# Patient Record
Sex: FEMALE | Race: WHITE | NOT HISPANIC OR LATINO | Employment: UNEMPLOYED | URBAN - NONMETROPOLITAN AREA
[De-identification: names, ages, dates, MRNs, and addresses within clinical notes are randomized per-mention and may not be internally consistent; named-entity substitution may affect disease eponyms.]

---

## 2017-04-13 ENCOUNTER — TRANSCRIBE ORDERS (OUTPATIENT)
Dept: ULTRASOUND IMAGING | Facility: HOSPITAL | Age: 52
End: 2017-04-13

## 2017-04-13 DIAGNOSIS — R09.89 BRUIT: Primary | ICD-10-CM

## 2017-04-21 ENCOUNTER — HOSPITAL ENCOUNTER (OUTPATIENT)
Dept: ULTRASOUND IMAGING | Facility: HOSPITAL | Age: 52
Discharge: HOME | End: 2017-04-21

## 2017-04-21 ENCOUNTER — HOSPITAL ENCOUNTER (OUTPATIENT)
Dept: ULTRASOUND IMAGING | Facility: HOSPITAL | Age: 52
Discharge: HOME | End: 2017-04-21
Admitting: NURSE PRACTITIONER

## 2017-04-21 DIAGNOSIS — R09.89 BRUIT: ICD-10-CM

## 2017-04-21 PROCEDURE — 76700 US EXAM ABDOM COMPLETE: CPT

## 2017-04-21 PROCEDURE — 76856 US EXAM PELVIC COMPLETE: CPT

## 2017-04-21 PROCEDURE — 76830 TRANSVAGINAL US NON-OB: CPT

## 2017-06-27 ENCOUNTER — OFFICE VISIT (OUTPATIENT)
Dept: GASTROENTEROLOGY | Facility: CLINIC | Age: 52
End: 2017-06-27

## 2017-06-27 VITALS
HEIGHT: 72 IN | WEIGHT: 169 LBS | BODY MASS INDEX: 22.89 KG/M2 | HEART RATE: 109 BPM | SYSTOLIC BLOOD PRESSURE: 137 MMHG | DIASTOLIC BLOOD PRESSURE: 78 MMHG

## 2017-06-27 DIAGNOSIS — B18.2 CHRONIC HEPATITIS C WITHOUT HEPATIC COMA (HCC): Primary | ICD-10-CM

## 2017-06-27 PROCEDURE — 99213 OFFICE O/P EST LOW 20 MIN: CPT | Performed by: INTERNAL MEDICINE

## 2017-06-27 RX ORDER — AMITRIPTYLINE HYDROCHLORIDE 10 MG/1
10 TABLET, FILM COATED ORAL DAILY
COMMUNITY
Start: 2017-06-21 | End: 2022-05-16

## 2017-06-27 RX ORDER — BUSPIRONE HYDROCHLORIDE 7.5 MG/1
7.5 TABLET ORAL DAILY
COMMUNITY
Start: 2017-06-21

## 2017-06-27 RX ORDER — MONTELUKAST SODIUM 10 MG/1
10 TABLET ORAL DAILY
COMMUNITY
Start: 2017-06-21 | End: 2022-05-16

## 2017-06-27 RX ORDER — ALBUTEROL SULFATE 90 UG/1
2 AEROSOL, METERED RESPIRATORY (INHALATION)
COMMUNITY
End: 2022-06-20 | Stop reason: SDUPTHER

## 2017-06-27 RX ORDER — LEVOFLOXACIN 500 MG/1
500 TABLET, FILM COATED ORAL DAILY
COMMUNITY
End: 2022-05-16

## 2017-06-27 RX ORDER — IBUPROFEN 200 MG
200 TABLET ORAL EVERY 6 HOURS PRN
COMMUNITY

## 2017-06-27 RX ORDER — CITALOPRAM 20 MG/1
20 TABLET ORAL DAILY
COMMUNITY
End: 2022-05-16

## 2017-06-27 NOTE — PROGRESS NOTES
[facility]      Chief Complaint:   Chief Complaint   Patient presents with   • Hepatitis C     Ref LIZ Santoro       Subjective     HPI:   Patient with recent diagnosis of hepatitis C.  Patient states she contracted this secondary to her son living in her house.  Patient has no history of IV drug use or blood transfusions.  Patient does not drink alcohol.    Plan; we'll do lab work including hepatitis C profile and hepatitis B profile.  We'll consider treatment for hepatitis C depending on results.  Patient had an ultrasound done which was normal.    Past Medical History:   Past Medical History:   Diagnosis Date   • Abnormal liver function tests     (AST 53)   • Acute bronchitis    • Acute gastritis    • Alcoholic fatty liver    • Dysfunctional uterine bleeding    • Elevated levels of transaminase & lactic acid dehydrogenase    • Fatigue    • Gastrointestinal food allergy     Wheat .63, sesame seed .23, peanut .33, shrimp .29, walnut .12, hazelnut .62.    • Iron overload     carrier hemochromatosis one copy 282Y    • Lesion of liver    • Nicotine dependence    • Palpitations    • Steatosis of liver    • Torsion of ovary    • Vertigo    • Wry neck     torticollis         Family History:  History reviewed. No pertinent family history.    Social History:   reports that she has been smoking.  She has never used smokeless tobacco. She reports that she does not drink alcohol or use illicit drugs.    Medications:   Current Outpatient Prescriptions   Medication Sig Dispense Refill   • albuterol (PROVENTIL HFA;VENTOLIN HFA) 108 (90 BASE) MCG/ACT inhaler Inhale 2 puffs 4 (Four) Times a Day.     • citalopram (CeleXA) 20 MG tablet Take 20 mg by mouth Daily.     • ibuprofen (ADVIL,MOTRIN) 200 MG tablet Take 200 mg by mouth Every 6 (Six) Hours As Needed for Mild Pain (1-3).     • levoFLOXacin (LEVAQUIN) 500 MG tablet Take 500 mg by mouth Daily.     • Ped Multivitamins-Fl-Iron (MULTIVITAMIN WITH  "FLUORIDE/IRON) 0.25-10 MG/ML solution solution Take  by mouth Daily.     • amitriptyline (ELAVIL) 10 MG tablet Take 10 mg by mouth Daily.     • busPIRone (BUSPAR) 7.5 MG tablet Take 7.5 mg by mouth Daily.     • montelukast (SINGULAIR) 10 MG tablet Take 10 mg by mouth Daily.       No current facility-administered medications for this visit.        Allergies:  Azithromycin; Morphine and related; Penicillins; and Sulfa antibiotics    ROS:    Review of Systems   Constitutional: Negative for activity change, appetite change, chills, diaphoresis, fatigue, fever and unexpected weight change.   HENT: Negative for sore throat and trouble swallowing.    Respiratory: Negative for shortness of breath.    Gastrointestinal: Negative for abdominal distention, abdominal pain, anal bleeding, blood in stool, constipation, diarrhea, nausea, rectal pain and vomiting.   Musculoskeletal: Negative for arthralgias.   Skin: Negative for pallor.   Neurological: Negative for light-headedness.     Objective     Blood pressure 137/78, pulse 109, height 72\" (182.9 cm), weight 169 lb (76.7 kg).    Physical Exam   Constitutional: She is oriented to person, place, and time. She appears well-developed and well-nourished. No distress.   HENT:   Head: Normocephalic and atraumatic.   Cardiovascular: Normal rate, regular rhythm, normal heart sounds and intact distal pulses.  Exam reveals no gallop and no friction rub.    No murmur heard.  Pulmonary/Chest: Breath sounds normal. No respiratory distress. She has no wheezes. She has no rales. She exhibits no tenderness.   Abdominal: Soft. Bowel sounds are normal. She exhibits no distension and no mass. There is no tenderness. There is no rebound and no guarding. No hernia.   Musculoskeletal: Normal range of motion. She exhibits no edema.   Neurological: She is alert and oriented to person, place, and time.   Skin: Skin is warm and dry. No rash noted. She is not diaphoretic. No erythema. No pallor. "   Psychiatric: She has a normal mood and affect. Her behavior is normal. Judgment and thought content normal.        Assessment/Plan   Rosalie was seen today for hepatitis c.    Diagnoses and all orders for this visit:    Chronic hepatitis C without hepatic coma  -     AFP Tumor Marker  -     CBC & Differential  -     Comprehensive Metabolic Panel  -     Ethanol  -     HCV FibroSURE  -     HCV NS5A Drug Resistance Assay; Future  -     Hepatitis C Genotype; Future  -     Hepatitis C RNA, Quantitative, PCR (graph)  -     Urine Drug Screen  -     Cancel: US Abdomen Complete  -     Hepatitis B & C Profile        * Surgery not found *     Diagnosis Plan   1. Chronic hepatitis C without hepatic coma  AFP Tumor Marker    CBC & Differential    Comprehensive Metabolic Panel    Ethanol    HCV FibroSURE    HCV NS5A Drug Resistance Assay    Hepatitis C Genotype    Hepatitis C RNA, Quantitative, PCR (graph)    Urine Drug Screen    Hepatitis B & C Profile       Anticipated Surgical Procedure:  Orders Placed This Encounter   Procedures   • AFP Tumor Marker   • Comprehensive Metabolic Panel   • Ethanol   • HCV FibroSURE   • HCV NS5A Drug Resistance Assay     Standing Status:   Future     Standing Expiration Date:   6/27/2018   • Hepatitis C Genotype     Standing Status:   Future     Standing Expiration Date:   6/27/2018   • Hepatitis C RNA, Quantitative, PCR (graph)   • Urine Drug Screen   • Hepatitis B & C Profile   • CBC & Differential     Order Specific Question:   Manual Differential     Answer:   No       The risks, benefits, and alternatives of this procedure have been discussed with the patient or the responsible party- the patient understands and agrees to proceed.

## 2017-07-25 ENCOUNTER — LAB (OUTPATIENT)
Dept: LAB | Facility: HOSPITAL | Age: 52
End: 2017-07-25

## 2017-07-25 DIAGNOSIS — B18.2 CHRONIC HEPATITIS C WITHOUT HEPATIC COMA (HCC): ICD-10-CM

## 2017-07-25 LAB
ALBUMIN SERPL-MCNC: 4.7 G/DL (ref 3.4–4.8)
ALBUMIN/GLOB SERPL: 1.4 G/DL (ref 1.1–1.8)
ALP SERPL-CCNC: 100 U/L (ref 38–126)
ALT SERPL W P-5'-P-CCNC: 29 U/L (ref 9–52)
ANION GAP SERPL CALCULATED.3IONS-SCNC: 9 MMOL/L (ref 5–15)
AST SERPL-CCNC: 43 U/L (ref 14–36)
BASOPHILS # BLD AUTO: 0.03 10*3/MM3 (ref 0–0.2)
BASOPHILS NFR BLD AUTO: 0.4 % (ref 0–2)
BILIRUB SERPL-MCNC: 0.5 MG/DL (ref 0.2–1.3)
BUN BLD-MCNC: 12 MG/DL (ref 7–21)
BUN/CREAT SERPL: 20 (ref 7–25)
CALCIUM SPEC-SCNC: 9.5 MG/DL (ref 8.4–10.2)
CHLORIDE SERPL-SCNC: 99 MMOL/L (ref 95–110)
CO2 SERPL-SCNC: 27 MMOL/L (ref 22–31)
CREAT BLD-MCNC: 0.6 MG/DL (ref 0.5–1)
DEPRECATED RDW RBC AUTO: 52.9 FL (ref 36.4–46.3)
EOSINOPHIL # BLD AUTO: 0.04 10*3/MM3 (ref 0–0.7)
EOSINOPHIL NFR BLD AUTO: 0.5 % (ref 0–7)
ERYTHROCYTE [DISTWIDTH] IN BLOOD BY AUTOMATED COUNT: 13 % (ref 11.5–14.5)
ETHANOL BLD-MCNC: <10 MG/DL (ref 0–10)
ETHANOL UR QL: <0.01 %
GFR SERPL CREATININE-BSD FRML MDRD: 105 ML/MIN/1.73 (ref 51–120)
GLOBULIN UR ELPH-MCNC: 3.3 GM/DL (ref 2.3–3.5)
GLUCOSE BLD-MCNC: 107 MG/DL (ref 60–100)
HCT VFR BLD AUTO: 44.5 % (ref 35–45)
HGB BLD-MCNC: 15 G/DL (ref 12–15.5)
IMM GRANULOCYTES # BLD: 0.02 10*3/MM3 (ref 0–0.02)
IMM GRANULOCYTES NFR BLD: 0.3 % (ref 0–0.5)
LYMPHOCYTES # BLD AUTO: 2.53 10*3/MM3 (ref 0.6–4.2)
LYMPHOCYTES NFR BLD AUTO: 33.2 % (ref 10–50)
MACROCYTES BLD QL SMEAR: NORMAL
MCH RBC QN AUTO: 37.6 PG (ref 26.5–34)
MCHC RBC AUTO-ENTMCNC: 33.7 G/DL (ref 31.4–36)
MCV RBC AUTO: 111.5 FL (ref 80–98)
MONOCYTES # BLD AUTO: 0.74 10*3/MM3 (ref 0–0.9)
MONOCYTES NFR BLD AUTO: 9.7 % (ref 0–12)
NEUTROPHILS # BLD AUTO: 4.25 10*3/MM3 (ref 2–8.6)
NEUTROPHILS NFR BLD AUTO: 55.9 % (ref 37–80)
NRBC BLD MANUAL-RTO: 0 /100 WBC (ref 0–0)
PLATELET # BLD AUTO: 320 10*3/MM3 (ref 150–450)
PMV BLD AUTO: 10.6 FL (ref 8–12)
POTASSIUM BLD-SCNC: 4 MMOL/L (ref 3.5–5.1)
PROT SERPL-MCNC: 8 G/DL (ref 6.3–8.6)
RBC # BLD AUTO: 3.99 10*6/MM3 (ref 3.77–5.16)
SMALL PLATELETS BLD QL SMEAR: ADEQUATE
SODIUM BLD-SCNC: 135 MMOL/L (ref 137–145)
WBC MORPH BLD: NORMAL
WBC NRBC COR # BLD: 7.61 10*3/MM3 (ref 3.2–9.8)

## 2017-07-25 PROCEDURE — 80074 ACUTE HEPATITIS PANEL: CPT | Performed by: INTERNAL MEDICINE

## 2017-07-25 PROCEDURE — 83010 ASSAY OF HAPTOGLOBIN QUANT: CPT | Performed by: INTERNAL MEDICINE

## 2017-07-25 PROCEDURE — 85025 COMPLETE CBC W/AUTO DIFF WBC: CPT | Performed by: INTERNAL MEDICINE

## 2017-07-25 PROCEDURE — 80053 COMPREHEN METABOLIC PANEL: CPT | Performed by: INTERNAL MEDICINE

## 2017-07-25 PROCEDURE — 36415 COLL VENOUS BLD VENIPUNCTURE: CPT | Performed by: INTERNAL MEDICINE

## 2017-07-25 PROCEDURE — 85007 BL SMEAR W/DIFF WBC COUNT: CPT | Performed by: INTERNAL MEDICINE

## 2017-07-25 PROCEDURE — 87900 PHENOTYPE INFECT AGENT DRUG: CPT | Performed by: INTERNAL MEDICINE

## 2017-07-25 PROCEDURE — 82247 BILIRUBIN TOTAL: CPT | Performed by: INTERNAL MEDICINE

## 2017-07-25 PROCEDURE — 82977 ASSAY OF GGT: CPT | Performed by: INTERNAL MEDICINE

## 2017-07-25 PROCEDURE — 80307 DRUG TEST PRSMV CHEM ANLYZR: CPT | Performed by: INTERNAL MEDICINE

## 2017-07-25 PROCEDURE — 83883 ASSAY NEPHELOMETRY NOT SPEC: CPT | Performed by: INTERNAL MEDICINE

## 2017-07-25 PROCEDURE — 82105 ALPHA-FETOPROTEIN SERUM: CPT | Performed by: INTERNAL MEDICINE

## 2017-07-25 PROCEDURE — 84460 ALANINE AMINO (ALT) (SGPT): CPT | Performed by: INTERNAL MEDICINE

## 2017-07-25 PROCEDURE — 87902 NFCT AGT GNTYP ALYS HEP C: CPT | Performed by: INTERNAL MEDICINE

## 2017-07-25 PROCEDURE — 87522 HEPATITIS C REVRS TRNSCRPJ: CPT | Performed by: INTERNAL MEDICINE

## 2017-07-26 LAB
AFP-TM SERPL-MCNC: 3.8 NG/ML (ref 0–8.3)
HAV IGM SERPL QL IA: NEGATIVE
HBV CORE IGM SERPL QL IA: NEGATIVE
HBV SURFACE AG SERPL QL IA: NEGATIVE
HCV AB SER DONR QL: REACTIVE

## 2017-07-27 LAB
A2 MACROGLOB SERPL-MCNC: 242 MG/DL (ref 110–276)
ALT SERPL W P-5'-P-CCNC: 19 IU/L (ref 0–40)
APO A-I SERPL-MCNC: 328 MG/DL (ref 116–209)
BILIRUB SERPL-MCNC: 0.3 MG/DL (ref 0–1.2)
FIBROSIS SCORING:: ABNORMAL
FIBROSIS STAGE SERPL QL: ABNORMAL
GGT SERPL-CCNC: 31 IU/L (ref 0–60)
HAPTOGLOB SERPL-MCNC: 212 MG/DL (ref 34–200)
HCV AB SER QL: ABNORMAL
HCV RNA SERPL NAA+PROBE-ACNC: NORMAL IU/ML
LABORATORY COMMENT REPORT: ABNORMAL
LIMITATIONS:: ABNORMAL
LIVER FIBR SCORE SERPL CALC.FIBROSURE: 0.02 (ref 0–0.21)
NECROINFLAMM ACTIVITY SCORING:: ABNORMAL
NECROINFLAMMATORY ACT GRADE SERPL QL: ABNORMAL
NECROINFLAMMATORY ACT SCORE SERPL: 0.05 (ref 0–0.17)
TEST INFORMATION: NORMAL

## 2017-07-28 LAB
HCV GENTYP SERPL NAA+PROBE: NORMAL
Lab: NORMAL

## 2017-08-02 LAB
HCV NS5 MUT DET ISLT GENOTYP: NORMAL
REF LAB TEST METHOD: NORMAL

## 2017-08-24 ENCOUNTER — OFFICE VISIT (OUTPATIENT)
Dept: GASTROENTEROLOGY | Facility: CLINIC | Age: 52
End: 2017-08-24

## 2017-08-24 VITALS
SYSTOLIC BLOOD PRESSURE: 143 MMHG | BODY MASS INDEX: 23.91 KG/M2 | WEIGHT: 167 LBS | HEIGHT: 70 IN | DIASTOLIC BLOOD PRESSURE: 78 MMHG | HEART RATE: 77 BPM

## 2017-08-24 DIAGNOSIS — B18.2 CHRONIC HEPATITIS C WITHOUT HEPATIC COMA (HCC): Primary | ICD-10-CM

## 2017-08-24 PROCEDURE — 99212 OFFICE O/P EST SF 10 MIN: CPT | Performed by: INTERNAL MEDICINE

## 2017-08-24 NOTE — PROGRESS NOTES
[facility]      Chief Complaint:   Chief Complaint   Patient presents with   • Results   • Hepatitis C       Subjective     HPI:   Patient with positive antibody to hepatitis C.  On testing for hepatitis C patients came back nondetectable.  Patient most likely is self cure will need repeat hepatitis C by PCR in 6 months.    Plan; while patient follow up with primary doctor.  If patient's hepatitis C comes back positive in 6 months she will need to come and see me.    Past Medical History:   Past Medical History:   Diagnosis Date   • Abnormal liver function tests     (AST 53)   • Acute bronchitis    • Acute gastritis    • Alcoholic fatty liver    • Dysfunctional uterine bleeding    • Elevated levels of transaminase & lactic acid dehydrogenase    • Fatigue    • Gastrointestinal food allergy     Wheat .63, sesame seed .23, peanut .33, shrimp .29, walnut .12, hazelnut .62.    • Iron overload     carrier hemochromatosis one copy 282Y    • Lesion of liver    • Nicotine dependence    • Palpitations    • Steatosis of liver    • Torsion of ovary    • Vertigo    • Wry neck     torticollis         Family History:  History reviewed. No pertinent family history.    Social History:   reports that she has been smoking.  She has never used smokeless tobacco. She reports that she does not drink alcohol or use illicit drugs.    Medications:   Current Outpatient Prescriptions   Medication Sig Dispense Refill   • albuterol (PROVENTIL HFA;VENTOLIN HFA) 108 (90 BASE) MCG/ACT inhaler Inhale 2 puffs 4 (Four) Times a Day.     • amitriptyline (ELAVIL) 10 MG tablet Take 10 mg by mouth Daily.     • busPIRone (BUSPAR) 7.5 MG tablet Take 7.5 mg by mouth Daily.     • citalopram (CeleXA) 20 MG tablet Take 20 mg by mouth Daily.     • ibuprofen (ADVIL,MOTRIN) 200 MG tablet Take 200 mg by mouth Every 6 (Six) Hours As Needed for Mild Pain (1-3).     • levoFLOXacin (LEVAQUIN) 500 MG tablet Take 500 mg by mouth Daily.     •  "montelukast (SINGULAIR) 10 MG tablet Take 10 mg by mouth Daily.     • Ped Multivitamins-Fl-Iron (MULTIVITAMIN WITH FLUORIDE/IRON) 0.25-10 MG/ML solution solution Take  by mouth Daily.       No current facility-administered medications for this visit.        Allergies:  Azithromycin; Morphine and related; Penicillins; and Sulfa antibiotics    ROS:    Review of Systems   Constitutional: Negative for activity change, appetite change, chills, diaphoresis, fatigue, fever and unexpected weight change.   HENT: Negative for sore throat and trouble swallowing.    Respiratory: Negative for shortness of breath.    Gastrointestinal: Negative for abdominal distention, abdominal pain, anal bleeding, blood in stool, constipation, diarrhea, nausea, rectal pain and vomiting.   Musculoskeletal: Negative for arthralgias.   Skin: Negative for pallor.   Neurological: Negative for light-headedness.     Objective     Blood pressure 143/78, pulse 77, height 70\" (177.8 cm), weight 167 lb (75.8 kg).    Physical Exam   Constitutional: She is oriented to person, place, and time. She appears well-developed and well-nourished. No distress.   HENT:   Head: Normocephalic and atraumatic.   Cardiovascular: Normal rate, regular rhythm, normal heart sounds and intact distal pulses.  Exam reveals no gallop and no friction rub.    No murmur heard.  Pulmonary/Chest: Breath sounds normal. No respiratory distress. She has no wheezes. She has no rales. She exhibits no tenderness.   Abdominal: Soft. Bowel sounds are normal. She exhibits no distension and no mass. There is no tenderness. There is no rebound and no guarding. No hernia.   Musculoskeletal: Normal range of motion. She exhibits no edema.   Neurological: She is alert and oriented to person, place, and time.   Skin: Skin is warm and dry. No rash noted. She is not diaphoretic. No erythema. No pallor.   Psychiatric: She has a normal mood and affect. Her behavior is normal. Judgment and thought content " normal.        Assessment/Plan   Rosalie was seen today for results and hepatitis c.    Diagnoses and all orders for this visit:    Chronic hepatitis C without hepatic coma        * Surgery not found *     Diagnosis Plan   1. Chronic hepatitis C without hepatic coma         Anticipated Surgical Procedure:  No orders of the defined types were placed in this encounter.      The risks, benefits, and alternatives of this procedure have been discussed with the patient or the responsible party- the patient understands and agrees to proceed.

## 2021-10-14 ENCOUNTER — HOSPITAL ENCOUNTER (OUTPATIENT)
Dept: CT IMAGING | Facility: HOSPITAL | Age: 56
Discharge: HOME | End: 2021-10-14
Admitting: FAMILY MEDICINE

## 2021-10-14 DIAGNOSIS — Z12.2 SCREENING FOR MALIGNANT NEOPLASM OF RESPIRATORY ORGAN: ICD-10-CM

## 2021-10-14 DIAGNOSIS — Z87.891 PERSONAL HISTORY OF TOBACCO USE, PRESENTING HAZARDS TO HEALTH: ICD-10-CM

## 2021-10-14 PROCEDURE — 71271 CT THORAX LUNG CANCER SCR C-: CPT

## 2022-03-29 DIAGNOSIS — J44.9 CHRONIC OBSTRUCTIVE PULMONARY DISEASE, UNSPECIFIED COPD TYPE: Primary | ICD-10-CM

## 2022-05-16 ENCOUNTER — OFFICE VISIT (OUTPATIENT)
Dept: PULMONOLOGY | Facility: CLINIC | Age: 57
End: 2022-05-16

## 2022-05-16 ENCOUNTER — PROCEDURE VISIT (OUTPATIENT)
Dept: PULMONOLOGY | Facility: CLINIC | Age: 57
End: 2022-05-16

## 2022-05-16 VITALS
BODY MASS INDEX: 26.34 KG/M2 | WEIGHT: 184 LBS | HEIGHT: 70 IN | DIASTOLIC BLOOD PRESSURE: 72 MMHG | HEART RATE: 65 BPM | OXYGEN SATURATION: 95 % | SYSTOLIC BLOOD PRESSURE: 106 MMHG

## 2022-05-16 DIAGNOSIS — F17.218 CIGARETTE NICOTINE DEPENDENCE WITH OTHER NICOTINE-INDUCED DISORDER: ICD-10-CM

## 2022-05-16 DIAGNOSIS — J44.9 COPD, VERY SEVERE: Primary | ICD-10-CM

## 2022-05-16 DIAGNOSIS — J44.9 CHRONIC OBSTRUCTIVE PULMONARY DISEASE, UNSPECIFIED COPD TYPE: ICD-10-CM

## 2022-05-16 PROCEDURE — 94727 GAS DIL/WSHOT DETER LNG VOL: CPT | Performed by: INTERNAL MEDICINE

## 2022-05-16 PROCEDURE — 99204 OFFICE O/P NEW MOD 45 MIN: CPT | Performed by: INTERNAL MEDICINE

## 2022-05-16 PROCEDURE — 94060 EVALUATION OF WHEEZING: CPT | Performed by: INTERNAL MEDICINE

## 2022-05-16 PROCEDURE — 94729 DIFFUSING CAPACITY: CPT | Performed by: INTERNAL MEDICINE

## 2022-05-16 RX ORDER — IPRATROPIUM BROMIDE AND ALBUTEROL SULFATE 2.5; .5 MG/3ML; MG/3ML
3 SOLUTION RESPIRATORY (INHALATION) EVERY 4 HOURS PRN
Qty: 360 ML | Refills: 11 | Status: SHIPPED | OUTPATIENT
Start: 2022-05-16

## 2022-05-16 RX ORDER — PREDNISONE 10 MG/1
TABLET ORAL
Qty: 27 TABLET | Refills: 0 | Status: SHIPPED | OUTPATIENT
Start: 2022-05-16 | End: 2022-06-20

## 2022-05-16 RX ORDER — BUDESONIDE AND FORMOTEROL FUMARATE DIHYDRATE 160; 4.5 UG/1; UG/1
2 AEROSOL RESPIRATORY (INHALATION)
COMMUNITY
End: 2022-06-20 | Stop reason: CLARIF

## 2022-05-16 RX ORDER — ALBUTEROL SULFATE 1.25 MG/3ML
1 SOLUTION RESPIRATORY (INHALATION) EVERY 6 HOURS PRN
COMMUNITY
End: 2022-05-16

## 2022-05-16 RX ORDER — FLUOXETINE HYDROCHLORIDE 20 MG/1
20 CAPSULE ORAL DAILY
COMMUNITY

## 2022-05-16 RX ORDER — ALBUTEROL SULFATE 90 UG/1
2 AEROSOL, METERED RESPIRATORY (INHALATION) EVERY 4 HOURS PRN
Qty: 18 G | Refills: 5 | Status: SHIPPED | OUTPATIENT
Start: 2022-05-16 | End: 2023-03-01

## 2022-05-16 RX ORDER — PROPRANOLOL HYDROCHLORIDE 10 MG/1
10 TABLET ORAL 2 TIMES DAILY
COMMUNITY

## 2022-05-16 NOTE — PROGRESS NOTES
FULL PFT WITH BRONCHODILATOR PERFORMED.     7+ SECOND EXHALATION ON SPIROMETRY, NO PLATEAU ACHIEVED, END OF TEST CRITERIA NOT MET.     GOOD PATIENT EFFORT AND COOPERATION.    ORDERED BY DR. ASHER, READ BY DR. ASHER    ((PT VERY SHORT OF BREATH DURING AND IN BETWEEN MANEUVERS. O2 SATS RANGING FROM 93-94, HR RANGING FROM 68-71))    NON-PRODUCTIVE, THICK COUGH

## 2022-05-16 NOTE — PROGRESS NOTES
"    Pulmonary Consultation    Ally Mendes MD,    Thank you for asking me to see Rosalie Fernandez for   Chief Complaint   Patient presents with   • COPD   .      History of Present Illness  Rosalie Fernandez is a 57 y.o. female     Patient referred for \"asthma exacerbation with COPD\" from [facility]    Patient states she has been told for years that she has asthma, or bronchitis or COPD. Has previously been on Advair and Breo, now on Symbicort for the last month. Doesn't feel like it's working. She is having more cough and shortness of breath. She quit smoking a week ago, was smoking 2-3 ppd. She has never seen a pulmnologist and never had full PFTs. She had a lung cancer screening Ct in Oct 2021 that didn't show anything concerning for cancer.      Tobacco use history:  Type: cigarettes  Amount: 2-3 ppd  Duration: 40 years  Cessation: 1 week ago         Review of Systems: History obtained from chart review and the patient.  Review of Systems  As described in the HPI. Otherwise, remainder of ROS (14 systems) were negative.    Patient Active Problem List   Diagnosis   • Chronic hepatitis C without hepatic coma (HCC)   • COPD, very severe (HCC)         Current Outpatient Medications:   •  albuterol (ACCUNEB) 1.25 MG/3ML nebulizer solution, Take 1 ampule by nebulization Every 6 (Six) Hours As Needed for Wheezing., Disp: , Rfl:   •  albuterol (PROVENTIL HFA;VENTOLIN HFA) 108 (90 BASE) MCG/ACT inhaler, Inhale 2 puffs 4 (Four) Times a Day., Disp: , Rfl:   •  budesonide-formoterol (SYMBICORT) 160-4.5 MCG/ACT inhaler, Inhale 2 puffs 2 (Two) Times a Day., Disp: , Rfl:   •  busPIRone (BUSPAR) 7.5 MG tablet, Take 7.5 mg by mouth Daily., Disp: , Rfl:   •  FLUoxetine (PROzac) 20 MG capsule, Take 20 mg by mouth Daily., Disp: , Rfl:   •  ibuprofen (ADVIL,MOTRIN) 200 MG tablet, Take 200 mg by mouth Every 6 (Six) Hours As Needed for Mild Pain (1-3)., Disp: , Rfl:   •  Ped Multivitamins-Fl-Iron (MULTIVITAMIN WITH FLUORIDE/IRON) " 0.25-10 MG/ML solution solution, Take  by mouth Daily., Disp: , Rfl:   •  propranolol (INDERAL) 10 MG tablet, Take 10 mg by mouth 2 (Two) Times a Day., Disp: , Rfl:   •  amitriptyline (ELAVIL) 10 MG tablet, Take 10 mg by mouth Daily., Disp: , Rfl:   •  citalopram (CeleXA) 20 MG tablet, Take 20 mg by mouth Daily., Disp: , Rfl:   •  levoFLOXacin (LEVAQUIN) 500 MG tablet, Take 500 mg by mouth Daily., Disp: , Rfl:   •  montelukast (SINGULAIR) 10 MG tablet, Take 10 mg by mouth Daily., Disp: , Rfl:     Allergies   Allergen Reactions   • Azithromycin    • Morphine And Related    • Penicillins    • Sulfa Antibiotics        Past Medical History:   Diagnosis Date   • Abnormal liver function tests     (AST 53)   • Acute bronchitis    • Acute gastritis    • Alcoholic fatty liver    • Dysfunctional uterine bleeding    • Elevated levels of transaminase & lactic acid dehydrogenase    • Fatigue    • Gastrointestinal food allergy     Wheat .63, sesame seed .23, peanut .33, shrimp .29, walnut .12, hazelnut .62.    • Iron overload     carrier hemochromatosis one copy 282Y    • Lesion of liver    • Nicotine dependence    • Palpitations    • Steatosis of liver    • Torsion of ovary    • Vertigo    • Wry neck     torticollis       Past Surgical History:   Procedure Laterality Date   • COLONOSCOPY  02/23/2015    Internal and external hemorrhoids found.   • ESOPHAGOSCOPY / EGD  02/23/2015    Normal esophagus. Gastritis found in the stomach. Biopsy taken. Normal duodenum. Biopsy taken   • EXPLORATORY LAPAROTOMY  11/11/2013    Exploratory laparotomy. Right oophorectomy, Dilatation and curettage with frozen section   • OOPHORECTOMY     • UPPER GASTROINTESTINAL ENDOSCOPY  02/23/2015     Social History     Socioeconomic History   • Marital status:    Tobacco Use   • Smoking status: Current Every Day Smoker   • Smokeless tobacco: Never Used   • Tobacco comment: 1 pack to 1 1/2 pack a day   Substance and Sexual Activity   • Alcohol use: No  "  • Drug use: No   • Sexual activity: Defer     Family History   Problem Relation Age of Onset   • Breast cancer Maternal Grandmother    • Ovarian cancer Maternal Grandmother           Objective     Blood pressure 106/72, pulse 65, height 177.8 cm (70\"), weight 83.5 kg (184 lb), SpO2 95 %.  Physical Exam  Vitals reviewed.   Constitutional:       Appearance: Normal appearance.   HENT:      Head: Normocephalic and atraumatic.      Nose: Nose normal.      Mouth/Throat:      Mouth: Mucous membranes are moist.      Pharynx: Oropharynx is clear.   Eyes:      Conjunctiva/sclera: Conjunctivae normal.      Pupils: Pupils are equal, round, and reactive to light.   Cardiovascular:      Rate and Rhythm: Normal rate and regular rhythm.      Pulses: Normal pulses.      Heart sounds: Normal heart sounds.   Pulmonary:      Effort: Pulmonary effort is normal.      Breath sounds: Rhonchi present.   Abdominal:      General: Abdomen is flat. Bowel sounds are normal.      Palpations: Abdomen is soft.   Musculoskeletal:         General: Normal range of motion.      Cervical back: Normal range of motion.   Skin:     General: Skin is warm and dry.   Neurological:      General: No focal deficit present.      Mental Status: She is alert and oriented to person, place, and time.   Psychiatric:         Mood and Affect: Mood normal.         Behavior: Behavior normal.         PFTs:  (independently reviewed and interpreted by me)  5/16/22  FVC 1.95L, 48%  FEV1 0.81L, 25%  +BDR  Ratio 42%  TLC 5.34L, 89%  %  DLCO 59%    Radiology (independently reviewed and interpreted by me):   LDCT 10/14/21- minimal emphysema, no concerning nodules       Assessment & Plan     Diagnoses and all orders for this visit:    1. COPD, very severe (HCC) (Primary)         Discussion/ Recommendations:   Patient with very severe obstruction, very positive BDR. She has stopped smoking for the last week, discussed that frequently symptoms will worsen in the weeks " immediately following cessation (especially at 2-3ppd). Right now she is undertreated, will step her up her therapy    Trelegy 200 dialy (4 week samples given)  Prednisone for 10 days  Duonebs as needed  Albuterol HFA prn  Continue to abstain from smoking  PFTs in 3 months    Follow up in a month for clinical check             No follow-ups on file.      Thank you for allowing me to participate in the care of Rosalie Fernandez. Please do not hesitate to contact me with any questions.         This document has been electronically signed by Laya Lantigua DO on May 16, 2022 11:11 CDT

## 2022-06-20 ENCOUNTER — OFFICE VISIT (OUTPATIENT)
Dept: PULMONOLOGY | Facility: CLINIC | Age: 57
End: 2022-06-20

## 2022-06-20 VITALS
WEIGHT: 180 LBS | HEART RATE: 80 BPM | TEMPERATURE: 97.3 F | DIASTOLIC BLOOD PRESSURE: 80 MMHG | SYSTOLIC BLOOD PRESSURE: 130 MMHG | HEIGHT: 70 IN | OXYGEN SATURATION: 93 % | BODY MASS INDEX: 25.77 KG/M2

## 2022-06-20 DIAGNOSIS — J44.9 COPD, VERY SEVERE: Primary | ICD-10-CM

## 2022-06-20 PROCEDURE — 99214 OFFICE O/P EST MOD 30 MIN: CPT | Performed by: NURSE PRACTITIONER

## 2022-06-20 PROCEDURE — 99406 BEHAV CHNG SMOKING 3-10 MIN: CPT | Performed by: NURSE PRACTITIONER

## 2022-06-20 NOTE — PROGRESS NOTES
"  Pulmonary Office Visit    Thank you for asking me to see Rosalie Fernandez for   Chief Complaint   Patient presents with   • COPD     Very severe   .      History of Present Illness  Rosalie Fernandez is a 57 y.o. female     Patient referred for \"asthma exacerbation with COPD\" from [facility]    Patient states she has been told for years that she has asthma, or bronchitis or COPD. Has previously been on Advair and Breo, now on Symbicort for the last month. Doesn't feel like it's working. She is having more cough and shortness of breath. She quit smoking a week ago, was smoking 2-3 ppd. She has never seen a pulmnologist and never had full PFTs. She had a lung cancer screening Ct in Oct 2021 that didn't show anything concerning for cancer.    6/20/22: 1 month follow up after starting Trelegy 200mcg. She was just recently diagnosed formally with very severe COPD with an FEV1 of 25% at the last visit. She was given 4 weeks of Trelegy to use. She likes it so far. I will attempt and prescription and PA for trelegy as she deserves triple therapy in one inhalation for her very severe COPD.     She started smoking again a week ago \"because her  made her mad\". We discussed again the significance to her new diagnosis and that she should absolutely stop smoking. She will stop again. She was smoking 3 ppd. She is now smoking around a 1/2 ppd. I told her to use lozenges or patches.         Tobacco use history:  Type: cigarettes  Amount: 2-3 ppd  Duration: 40 years  Cessation: 1 week ago         Review of Systems: History obtained from chart review and the patient.  Review of Systems   Constitutional: Negative for diaphoresis, fatigue, fever and unexpected weight change.   Respiratory: Positive for shortness of breath. Negative for cough, chest tightness and wheezing.    Cardiovascular: Negative for chest pain, palpitations and leg swelling.   Gastrointestinal: Negative for abdominal distention and blood in stool. "   Genitourinary: Negative for hematuria.   Musculoskeletal: Negative for arthralgias and myalgias.   Skin: Negative for pallor and rash.   Neurological: Negative for dizziness, syncope and weakness.   Hematological: Does not bruise/bleed easily.   Psychiatric/Behavioral: Negative for confusion. The patient is not nervous/anxious.      As described in the HPI. Otherwise, remainder of ROS (14 systems) were negative.    Patient Active Problem List   Diagnosis   • Chronic hepatitis C without hepatic coma (HCC)   • COPD, very severe (HCC)         Current Outpatient Medications:   •  albuterol sulfate HFA (Ventolin HFA) 108 (90 Base) MCG/ACT inhaler, Inhale 2 puffs Every 4 (Four) Hours As Needed for Wheezing or Shortness of Air., Disp: 18 g, Rfl: 5  •  busPIRone (BUSPAR) 7.5 MG tablet, Take 7.5 mg by mouth Daily., Disp: , Rfl:   •  ibuprofen (ADVIL,MOTRIN) 200 MG tablet, Take 200 mg by mouth Every 6 (Six) Hours As Needed for Mild Pain (1-3)., Disp: , Rfl:   •  ipratropium-albuterol (DUO-NEB) 0.5-2.5 mg/3 ml nebulizer, Take 3 mL by nebulization Every 4 (Four) Hours As Needed for Wheezing or Shortness of Air., Disp: 360 mL, Rfl: 11  •  Ped Multivitamins-Fl-Iron (MULTIVITAMIN WITH FLUORIDE/IRON) 0.25-10 MG/ML solution solution, Take  by mouth Daily., Disp: , Rfl:   •  propranolol (INDERAL) 10 MG tablet, Take 10 mg by mouth 2 (Two) Times a Day., Disp: , Rfl:   •  FLUoxetine (PROzac) 20 MG capsule, Take 20 mg by mouth Daily., Disp: , Rfl:   •  Fluticasone-Umeclidin-Vilant (Trelegy Ellipta) 200-62.5-25 MCG/INH inhaler, Inhale 1 puff Daily., Disp: 28 each, Rfl: 11    Allergies   Allergen Reactions   • Azithromycin    • Morphine And Related    • Penicillins    • Sulfa Antibiotics        Past Medical History:   Diagnosis Date   • Abnormal liver function tests     (AST 53)   • Acute bronchitis    • Acute gastritis    • Alcoholic fatty liver    • Dysfunctional uterine bleeding    • Elevated levels of transaminase & lactic acid  "dehydrogenase    • Fatigue    • Gastrointestinal food allergy     Wheat .63, sesame seed .23, peanut .33, shrimp .29, walnut .12, hazelnut .62.    • Iron overload     carrier hemochromatosis one copy 282Y    • Lesion of liver    • Nicotine dependence    • Palpitations    • Steatosis of liver    • Torsion of ovary    • Vertigo    • Wry neck     torticollis       Past Surgical History:   Procedure Laterality Date   • COLONOSCOPY  02/23/2015    Internal and external hemorrhoids found.   • ESOPHAGOSCOPY / EGD  02/23/2015    Normal esophagus. Gastritis found in the stomach. Biopsy taken. Normal duodenum. Biopsy taken   • EXPLORATORY LAPAROTOMY  11/11/2013    Exploratory laparotomy. Right oophorectomy, Dilatation and curettage with frozen section   • OOPHORECTOMY     • UPPER GASTROINTESTINAL ENDOSCOPY  02/23/2015     Social History     Socioeconomic History   • Marital status:    Tobacco Use   • Smoking status: Current Every Day Smoker   • Smokeless tobacco: Never Used   • Tobacco comment: 1 pack to 1 1/2 pack a day   Substance and Sexual Activity   • Alcohol use: No   • Drug use: No   • Sexual activity: Defer     Family History   Problem Relation Age of Onset   • Breast cancer Maternal Grandmother    • Ovarian cancer Maternal Grandmother           Objective     Blood pressure 130/80, pulse 80, temperature 97.3 °F (36.3 °C), height 177.8 cm (70\"), weight 81.6 kg (180 lb), SpO2 93 %.     Physical Exam  Vitals and nursing note reviewed.   Constitutional:       General: She is not in acute distress.     Appearance: Normal appearance. She is well-developed. She is not diaphoretic.   HENT:      Head: Normocephalic and atraumatic.      Nose: Nose normal.      Mouth/Throat:      Mouth: Mucous membranes are moist.      Pharynx: Oropharynx is clear.   Eyes:      Conjunctiva/sclera: Conjunctivae normal.      Pupils: Pupils are equal, round, and reactive to light.   Neck:      Vascular: No JVD.   Cardiovascular:      Rate and " Rhythm: Normal rate and regular rhythm.      Pulses: Normal pulses.      Heart sounds: Normal heart sounds, S1 normal and S2 normal. No murmur heard.    No friction rub. No gallop.   Pulmonary:      Effort: Pulmonary effort is normal. No respiratory distress.      Breath sounds: No wheezing, rhonchi or rales.   Abdominal:      General: Abdomen is flat. Bowel sounds are normal. There is no distension.      Palpations: Abdomen is soft.   Musculoskeletal:         General: Normal range of motion.      Cervical back: Normal range of motion.   Skin:     General: Skin is warm and dry.      Findings: No erythema.   Neurological:      General: No focal deficit present.      Mental Status: She is alert and oriented to person, place, and time.   Psychiatric:         Mood and Affect: Mood normal.         Behavior: Behavior normal.         Thought Content: Thought content normal.         Judgment: Judgment normal.         PFTs:  (independently reviewed and interpreted by me)  5/16/22  FVC 1.95L, 48%  FEV1 0.81L, 25%  +BDR  Ratio 42%  TLC 5.34L, 89%  %  DLCO 59%    Radiology (independently reviewed and interpreted by me):   LDCT 10/14/21- minimal emphysema, no concerning nodules       Assessment & Plan     Diagnoses and all orders for this visit:    1. COPD, very severe (HCC) (Primary)  -     Full Pulmonary Function Test With Bronchodilator; Future  -     Home Nebulizer Accessories    Other orders  -     Fluticasone-Umeclidin-Vilant (Trelegy Ellipta) 200-62.5-25 MCG/INH inhaler; Inhale 1 puff Daily.  Dispense: 28 each; Refill: 11         Discussion/ Recommendations:   Patient with very severe obstruction, very positive BDR. She has stopped smoking for the last week, discussed that frequently symptoms will worsen in the weeks immediately following cessation (especially at 2-3ppd). Right now she is undertreated, will step her up her therapy    Trelegy 200 daily (4 week samples given) and script so I can do a PA.   Bernice as  needed  Albuterol HFA prn  Continue to abstain from smoking. She went back to smoking after 22 days.  Will stop again.  Rosalie Fernandez  reports that she has been smoking. She has never used smokeless tobacco.. I have educated her on the risk of diseases from using tobacco products such as cancer, COPD and heart disease.     I advised her to quit and she is willing to quit. We have discussed the following method/s for tobacco cessation:  Cold [place].  Together we have set a quit date for today.  She will follow up with me in 4 weeks or sooner to check on her progress.    I spent 8 minutes counseling the patient.    PFTs in 3 months  LDCT due after 1014/2021    Follow up in 3 month with pft prior    I spent 30 minutes caring for Rosalie on this date of service. This time includes time spent by me in the following activities: preparing for the visit, reviewing tests, obtaining and/or reviewing a separately obtained history, performing a medically appropriate examination and/or evaluation, counseling and educating the patient/family/caregiver, ordering medications, tests, or procedures, referring and communicating with other health care professionals, documenting information in the medical record, independently interpreting results and communicating that information with the patient/family/caregiver and care coordination            This document has been electronically signed by BRITT Lopez on June 20, 2022 15:57 CDT

## 2022-11-02 ENCOUNTER — HOSPITAL ENCOUNTER (OUTPATIENT)
Dept: CT IMAGING | Facility: HOSPITAL | Age: 57
Discharge: HOME | End: 2022-11-02
Admitting: FAMILY MEDICINE

## 2022-11-02 DIAGNOSIS — F17.210 CIGARETTE SMOKER: ICD-10-CM

## 2022-11-02 DIAGNOSIS — Z12.2 SCREENING FOR MALIGNANT NEOPLASM OF RESPIRATORY ORGAN: ICD-10-CM

## 2022-11-02 PROCEDURE — 71271 CT THORAX LUNG CANCER SCR C-: CPT

## 2023-03-01 ENCOUNTER — OFFICE VISIT (OUTPATIENT)
Dept: PULMONOLOGY | Facility: CLINIC | Age: 58
End: 2023-03-01
Payer: MEDICAID

## 2023-03-01 VITALS
DIASTOLIC BLOOD PRESSURE: 80 MMHG | OXYGEN SATURATION: 99 % | BODY MASS INDEX: 28.66 KG/M2 | HEART RATE: 72 BPM | SYSTOLIC BLOOD PRESSURE: 114 MMHG | WEIGHT: 200.2 LBS | HEIGHT: 70 IN

## 2023-03-01 DIAGNOSIS — J44.9 COPD, VERY SEVERE: Primary | ICD-10-CM

## 2023-03-01 PROCEDURE — 99214 OFFICE O/P EST MOD 30 MIN: CPT | Performed by: INTERNAL MEDICINE

## 2023-03-01 RX ORDER — FLUTICASONE PROPIONATE 50 MCG
2 SPRAY, SUSPENSION (ML) NASAL DAILY
COMMUNITY

## 2023-03-01 RX ORDER — FLUTICASONE FUROATE, UMECLIDINIUM BROMIDE AND VILANTEROL TRIFENATATE 200; 62.5; 25 UG/1; UG/1; UG/1
1 POWDER RESPIRATORY (INHALATION) DAILY
Qty: 1 EACH | Refills: 11 | Status: SHIPPED | OUTPATIENT
Start: 2023-03-01

## 2023-03-01 RX ORDER — ALBUTEROL SULFATE 90 UG/1
2 AEROSOL, METERED RESPIRATORY (INHALATION) EVERY 4 HOURS PRN
Qty: 36 G | Refills: 5 | Status: SHIPPED | OUTPATIENT
Start: 2023-03-01

## 2023-03-01 NOTE — PROGRESS NOTES
"    Pulmonary Consultation    No ref. provider found,    Thank you for asking me to see Rosalie Fernandez for   Chief Complaint   Patient presents with   • COPD   .      History of Present Illness  Rosalie Fernandez is a 58 y.o. female     3/1/23  Patient here for follow up. Since last appointment she was seen for initial follow up by our NP, who ordered her a nebulizer machine. She canceled her next follow up and PFTs, and now has not been seen in 9 months.  She tells me she is still using the Trelegy, she is using that daily. She has her albuterol and uses that some times. She rarely uses her nebulizer. She denies hospitalizations.    Smoking about 1.5p/day      5/16/22  Patient referred for \"asthma exacerbation with COPD\" from [facility]    Patient states she has been told for years that she has asthma, or bronchitis or COPD. Has previously been on Advair and Breo, now on Symbicort for the last month. Doesn't feel like it's working. She is having more cough and shortness of breath. She quit smoking a week ago, was smoking 2-3 ppd. She has never seen a pulmnologist and never had full PFTs. She had a lung cancer screening Ct in Oct 2021 that didn't show anything concerning for cancer.      Tobacco use history:  Type: cigarettes  Amount: 2-3 ppd  Duration: 40 years  Cessation: 1 week ago         Review of Systems: History obtained from chart review and the patient.  Review of Systems  As described in the HPI. Otherwise, remainder of ROS (14 systems) were negative.    Patient Active Problem List   Diagnosis   • Chronic hepatitis C without hepatic coma (HCC)   • COPD, very severe (HCC)         Current Outpatient Medications:   •  busPIRone (BUSPAR) 7.5 MG tablet, Take 1 tablet by mouth Daily., Disp: , Rfl:   •  FLUoxetine (PROzac) 20 MG capsule, Take 1 capsule by mouth Daily., Disp: , Rfl:   •  fluticasone (FLONASE) 50 MCG/ACT nasal spray, 2 sprays into the nostril(s) as directed by provider Daily., Disp: , Rfl:   •  " ibuprofen (ADVIL,MOTRIN) 200 MG tablet, Take 1 tablet by mouth Every 6 (Six) Hours As Needed for Mild Pain., Disp: , Rfl:   •  ipratropium-albuterol (DUO-NEB) 0.5-2.5 mg/3 ml nebulizer, Take 3 mL by nebulization Every 4 (Four) Hours As Needed for Wheezing or Shortness of Air., Disp: 360 mL, Rfl: 11  •  Ped Multivitamins-Fl-Iron (MULTIVITAMIN WITH FLUORIDE/IRON) 0.25-10 MG/ML solution solution, Take  by mouth Daily., Disp: , Rfl:   •  propranolol (INDERAL) 10 MG tablet, Take 1 tablet by mouth 2 (Two) Times a Day., Disp: , Rfl:   •  albuterol sulfate HFA (Ventolin HFA) 108 (90 Base) MCG/ACT inhaler, Inhale 2 puffs Every 4 (Four) Hours As Needed for Wheezing or Shortness of Air., Disp: 36 g, Rfl: 5  •  Fluticasone-Umeclidin-Vilant (Trelegy Ellipta) 200-62.5-25 MCG/ACT aerosol powder , Inhale 1 inhaler Daily., Disp: 1 each, Rfl: 11    Allergies   Allergen Reactions   • Azithromycin    • Morphine And Related    • Penicillins    • Sulfa Antibiotics        Past Medical History:   Diagnosis Date   • Abnormal liver function tests     (AST 53)   • Acute bronchitis    • Acute gastritis    • Alcoholic fatty liver    • Dysfunctional uterine bleeding    • Elevated levels of transaminase & lactic acid dehydrogenase    • Fatigue    • Gastrointestinal food allergy     Wheat .63, sesame seed .23, peanut .33, shrimp .29, walnut .12, hazelnut .62.    • Iron overload     carrier hemochromatosis one copy 282Y    • Lesion of liver    • Nicotine dependence    • Palpitations    • Steatosis of liver    • Torsion of ovary    • Vertigo    • Wry neck     torticollis       Past Surgical History:   Procedure Laterality Date   • COLONOSCOPY  02/23/2015    Internal and external hemorrhoids found.   • ESOPHAGOSCOPY / EGD  02/23/2015    Normal esophagus. Gastritis found in the stomach. Biopsy taken. Normal duodenum. Biopsy taken   • EXPLORATORY LAPAROTOMY  11/11/2013    Exploratory laparotomy. Right oophorectomy, Dilatation and curettage with frozen  "section   • OOPHORECTOMY     • UPPER GASTROINTESTINAL ENDOSCOPY  02/23/2015     Social History     Socioeconomic History   • Marital status:    Tobacco Use   • Smoking status: Every Day     Packs/day: 3.00     Years: 40.00     Pack years: 120.00     Types: Cigarettes     Start date: 1980   • Smokeless tobacco: Never   Substance and Sexual Activity   • Alcohol use: No   • Drug use: No   • Sexual activity: Defer     Family History   Problem Relation Age of Onset   • Breast cancer Maternal Grandmother    • Ovarian cancer Maternal Grandmother           Objective     Blood pressure 114/80, pulse 72, height 177.8 cm (70\"), weight 90.8 kg (200 lb 3.2 oz), SpO2 99 %.  Physical Exam  Vitals reviewed.   Constitutional:       Appearance: Normal appearance.   HENT:      Head: Normocephalic and atraumatic.      Nose: Nose normal.      Mouth/Throat:      Mouth: Mucous membranes are moist.      Pharynx: Oropharynx is clear.   Eyes:      Conjunctiva/sclera: Conjunctivae normal.      Pupils: Pupils are equal, round, and reactive to light.   Cardiovascular:      Rate and Rhythm: Normal rate and regular rhythm.      Pulses: Normal pulses.      Heart sounds: Normal heart sounds.   Pulmonary:      Effort: Pulmonary effort is normal.      Breath sounds: Rhonchi present.   Abdominal:      General: Abdomen is flat. Bowel sounds are normal.      Palpations: Abdomen is soft.   Musculoskeletal:         General: Normal range of motion.      Cervical back: Normal range of motion.   Skin:     General: Skin is warm and dry.   Neurological:      General: No focal deficit present.      Mental Status: She is alert and oriented to person, place, and time.   Psychiatric:         Mood and Affect: Mood normal.         Behavior: Behavior normal.         PFTs:  (independently reviewed and interpreted by me)  5/16/22  FVC 1.95L, 48%  FEV1 0.81L, 25%  +BDR  Ratio 42%  TLC 5.34L, 89%  %  DLCO 59%    Radiology (independently reviewed and " interpreted by me):   LDCT 10/14/21- minimal emphysema, no concerning nodules  LDCT 11/2/22- no concerning nodules       Assessment & Plan     Diagnoses and all orders for this visit:    1. COPD, very severe (HCC) (Primary)  -     Full Pulmonary Function Test With Bronchodilator; Future    Other orders  -     Fluticasone-Umeclidin-Vilant (Trelegy Ellipta) 200-62.5-25 MCG/ACT aerosol powder ; Inhale 1 inhaler Daily.  Dispense: 1 each; Refill: 11  -     albuterol sulfate HFA (Ventolin HFA) 108 (90 Base) MCG/ACT inhaler; Inhale 2 puffs Every 4 (Four) Hours As Needed for Wheezing or Shortness of Air.  Dispense: 36 g; Refill: 5         Discussion/ Recommendations:   Patient with severe COPD, no recent exacerbations. Still smoking. Conitnue on current meds. Spent 20 mins discussing smokingcessation. Will see her back in 6 months with PFTs. Annual CT with PCM.    Trelegy 200 dialy   Duonebs as needed  Albuterol HFA prn   abstain from smoking                   Return in about 6 months (around 9/1/2023) for f/u COPD, w/ PFTs.      Thank you for allowing me to participate in the care of Rosalie Fernandez. Please do not hesitate to contact me with any questions.         This document has been electronically signed by Laya Lantigua DO on March 1, 2023 09:19 CST

## 2023-09-01 ENCOUNTER — OFFICE VISIT (OUTPATIENT)
Dept: PULMONOLOGY | Facility: CLINIC | Age: 58
End: 2023-09-01
Payer: MEDICAID

## 2023-09-01 ENCOUNTER — PROCEDURE VISIT (OUTPATIENT)
Dept: PULMONOLOGY | Facility: CLINIC | Age: 58
End: 2023-09-01
Payer: MEDICAID

## 2023-09-01 VITALS
BODY MASS INDEX: 27.2 KG/M2 | OXYGEN SATURATION: 89 % | DIASTOLIC BLOOD PRESSURE: 84 MMHG | SYSTOLIC BLOOD PRESSURE: 146 MMHG | HEART RATE: 96 BPM | WEIGHT: 190 LBS | HEIGHT: 70 IN

## 2023-09-01 DIAGNOSIS — J44.9 COPD, VERY SEVERE: ICD-10-CM

## 2023-09-01 DIAGNOSIS — J44.9 COPD, SEVERE: Primary | ICD-10-CM

## 2023-09-01 PROCEDURE — 99214 OFFICE O/P EST MOD 30 MIN: CPT | Performed by: INTERNAL MEDICINE

## 2023-09-01 RX ORDER — BUDESONIDE, GLYCOPYRROLATE, AND FORMOTEROL FUMARATE 160; 9; 4.8 UG/1; UG/1; UG/1
2 AEROSOL, METERED RESPIRATORY (INHALATION) 2 TIMES DAILY
Qty: 1 EACH | Refills: 11 | Status: SHIPPED | OUTPATIENT
Start: 2023-09-01

## 2023-09-01 RX ORDER — PREDNISONE 10 MG/1
TABLET ORAL
Qty: 22 TABLET | Refills: 0 | Status: SHIPPED | OUTPATIENT
Start: 2023-09-01

## 2023-09-01 NOTE — PROGRESS NOTES
FULL PFT WITH BRONCHODILATOR PERFORMED.     GOOD PATIENT EFFORT AND COOPERATION.     11-13 SECOND EXHALATION ON SPIROMETRY.    ORDERED BY DR. ASHER, READ BY DR. ASHER

## 2023-09-01 NOTE — PROGRESS NOTES
"    Pulmonary Consultation    No ref. provider found,    Thank you for asking me to see Rosalie Fernandez for   Chief Complaint   Patient presents with    COPD     Chief complaint   .      History of Present Illness  Rosalie Fernandez is a 58 y.o. female     9/1/23  Patient here for follow up. Since last visit, she continues to smoke. She reports she went to  a few weeks ago and was told she had \"walking pneumonia\". She was given prednisone and doxy and improved. She is using her Trelegy inhaler and does think that helps. Her SpO2 was 89% and she was labored after PFTs today. Put her on 2L O2 and she came up ot 94%      3/1/23  Patient here for follow up. Since last appointment she was seen for initial follow up by our NP, who ordered her a nebulizer machine. She canceled her next follow up and PFTs, and now has not been seen in 9 months.  She tells me she is still using the Trelegy, she is using that daily. She has her albuterol and uses that some times. She rarely uses her nebulizer. She denies hospitalizations.    Smoking about 1.5p/day      5/16/22  Patient referred for \"asthma exacerbation with COPD\" from [facility]    Patient states she has been told for years that she has asthma, or bronchitis or COPD. Has previously been on Advair and Breo, now on Symbicort for the last month. Doesn't feel like it's working. She is having more cough and shortness of breath. She quit smoking a week ago, was smoking 2-3 ppd. She has never seen a pulmnologist and never had full PFTs. She had a lung cancer screening Ct in Oct 2021 that didn't show anything concerning for cancer.      Tobacco use history:  Type: cigarettes  Amount: 2-3 ppd  Duration: 40 years  Cessation: 1 week ago         Review of Systems: History obtained from chart review and the patient.  Review of Systems  As described in the HPI. Otherwise, remainder of ROS (14 systems) were negative.    Patient Active Problem List   Diagnosis    Chronic hepatitis C without " hepatic coma    COPD, very severe         Current Outpatient Medications:     albuterol sulfate HFA (Ventolin HFA) 108 (90 Base) MCG/ACT inhaler, Inhale 2 puffs Every 4 (Four) Hours As Needed for Wheezing or Shortness of Air., Disp: 36 g, Rfl: 5    busPIRone (BUSPAR) 7.5 MG tablet, Take 1 tablet by mouth Daily., Disp: , Rfl:     fluticasone (FLONASE) 50 MCG/ACT nasal spray, 2 sprays into the nostril(s) as directed by provider Daily., Disp: , Rfl:     ibuprofen (ADVIL,MOTRIN) 200 MG tablet, Take 1 tablet by mouth Every 6 (Six) Hours As Needed for Mild Pain., Disp: , Rfl:     ipratropium-albuterol (DUO-NEB) 0.5-2.5 mg/3 ml nebulizer, Take 3 mL by nebulization Every 4 (Four) Hours As Needed for Wheezing or Shortness of Air., Disp: 360 mL, Rfl: 11    Ped Multivitamins-Fl-Iron (MULTIVITAMIN WITH FLUORIDE/IRON) 0.25-10 MG/ML solution solution, Take  by mouth Daily., Disp: , Rfl:     propranolol (INDERAL) 10 MG tablet, Take 1 tablet by mouth 2 (Two) Times a Day., Disp: , Rfl:     Budeson-Glycopyrrol-Formoterol (Breztri Aerosphere) 160-9-4.8 MCG/ACT aerosol inhaler, Inhale 2 puffs 2 (Two) Times a Day., Disp: 1 each, Rfl: 11    FLUoxetine (PROzac) 20 MG capsule, Take 1 capsule by mouth Daily. (Patient not taking: Reported on 9/1/2023), Disp: , Rfl:     predniSONE (DELTASONE) 10 MG tablet, Take 40mg PO x 3d, then 30mg PO x 2d, then 20mg PO x 2d then stop, Disp: 22 tablet, Rfl: 0    Allergies   Allergen Reactions    Azithromycin     Morphine And Related     Penicillins     Sulfa Antibiotics        Past Medical History:   Diagnosis Date    Abnormal liver function tests     (AST 53)    Acute bronchitis     Acute gastritis     Alcoholic fatty liver     Dysfunctional uterine bleeding     Elevated levels of transaminase & lactic acid dehydrogenase     Fatigue     Gastrointestinal food allergy     Wheat .63, sesame seed .23, peanut .33, shrimp .29, walnut .12, hazelnut .62.     Iron overload     carrier hemochromatosis one copy 282Y  "    Lesion of liver     Nicotine dependence     Palpitations     Steatosis of liver     Torsion of ovary     Vertigo     Wry neck     torticollis       Past Surgical History:   Procedure Laterality Date    COLONOSCOPY  02/23/2015    Internal and external hemorrhoids found.    ESOPHAGOSCOPY / EGD  02/23/2015    Normal esophagus. Gastritis found in the stomach. Biopsy taken. Normal duodenum. Biopsy taken    EXPLORATORY LAPAROTOMY  11/11/2013    Exploratory laparotomy. Right oophorectomy, Dilatation and curettage with frozen section    OOPHORECTOMY      UPPER GASTROINTESTINAL ENDOSCOPY  02/23/2015     Social History     Socioeconomic History    Marital status:    Tobacco Use    Smoking status: Every Day     Packs/day: 3.00     Years: 40.00     Pack years: 120.00     Types: Cigarettes     Start date: 1980    Smokeless tobacco: Never   Substance and Sexual Activity    Alcohol use: No    Drug use: No    Sexual activity: Defer     Family History   Problem Relation Age of Onset    Breast cancer Maternal Grandmother     Ovarian cancer Maternal Grandmother           Objective     Blood pressure 146/84, pulse 96, height 177.8 cm (70\"), weight 86.2 kg (190 lb), SpO2 (!) 89 %.  Physical Exam  Vitals reviewed.   Constitutional:       Appearance: Normal appearance.   HENT:      Head: Normocephalic and atraumatic.      Nose: Nose normal.      Mouth/Throat:      Mouth: Mucous membranes are moist.      Pharynx: Oropharynx is clear.   Eyes:      Conjunctiva/sclera: Conjunctivae normal.      Pupils: Pupils are equal, round, and reactive to light.   Cardiovascular:      Rate and Rhythm: Normal rate and regular rhythm.      Pulses: Normal pulses.      Heart sounds: Normal heart sounds.   Pulmonary:      Effort: Pulmonary effort is normal.      Breath sounds: Rhonchi present.   Abdominal:      General: Abdomen is flat. Bowel sounds are normal.      Palpations: Abdomen is soft.   Musculoskeletal:         General: Normal range of " motion.      Cervical back: Normal range of motion.   Skin:     General: Skin is warm and dry.   Neurological:      General: No focal deficit present.      Mental Status: She is alert and oriented to person, place, and time.   Psychiatric:         Mood and Affect: Mood normal.         Behavior: Behavior normal.       PFTs:  (independently reviewed and interpreted by me)  5/16/22  FVC 1.95L, 48%  FEV1 0.81L, 25%  +BDR  Ratio 42%  TLC 5.34L, 89%  %  DLCO 59%    9/1/23  FVC 2.78L, 69%  FEV1 1.21L, 38%  Ratio 43%  TLC 4.90L, 82%  DLCO 68%    Radiology (independently reviewed and interpreted by me):   LDCT 10/14/21- minimal emphysema, no concerning nodules  LDCT 11/2/22- no concerning nodules       Assessment & Plan     Diagnoses and all orders for this visit:    1. COPD, severe (Primary)    Other orders  -     Budeson-Glycopyrrol-Formoterol (Breztri Aerosphere) 160-9-4.8 MCG/ACT aerosol inhaler; Inhale 2 puffs 2 (Two) Times a Day.  Dispense: 1 each; Refill: 11  -     predniSONE (DELTASONE) 10 MG tablet; Take 40mg PO x 3d, then 30mg PO x 2d, then 20mg PO x 2d then stop  Dispense: 22 tablet; Refill: 0           Discussion/ Recommendations:   Patinet with slight improvement in her FEV1 and TLC since last time so I do think the Trelegy is helping. Explained that it sounds like she had a COPD exacerbation a few weeks ago, not pneumonia. I think if she had true bacterial pnuemonia she would be in the hospital due to the severity of her underlying lung disease  We had a long conversation that if she doesn't get serious about smoking cessation there is going to be little else I can do for her  Will try changing her Trelegy to Breztri and see how she does  PCM is supposed to order LDCT    Will see her back in 3 months and see how she's doing with new inhaler                   Return in about 3 months (around 12/1/2023) for f/u COPD.      Thank you for allowing me to participate in the care of Rosalie Fernandez. Please do  not hesitate to contact me with any questions.         This document has been electronically signed by Laya Lantigua DO on September 1, 2023 13:53 CDT